# Patient Record
(demographics unavailable — no encounter records)

---

## 2024-11-01 NOTE — HISTORY OF PRESENT ILLNESS
[FreeTextEntry1] : 88 yo F presented with urinary retention no prior issue in August always had urinary issues usually voids in pull-ups 3 pull-ups during the day toilet for BMs, sometimes constipation no gross hematuria Drinks 2 bottles of water, 1 cup of coffee,  3 children,  history of prolapse surgery - hsyterectomy and bladder lift in  in [facility]